# Patient Record
Sex: MALE | Race: WHITE
[De-identification: names, ages, dates, MRNs, and addresses within clinical notes are randomized per-mention and may not be internally consistent; named-entity substitution may affect disease eponyms.]

---

## 2018-07-10 ENCOUNTER — HOSPITAL ENCOUNTER (INPATIENT)
Dept: HOSPITAL 12 - ER | Age: 63
LOS: 5 days | Discharge: NURSING, REHAB OR LONG-TERM CARE | DRG: 344 | End: 2018-07-15
Payer: MEDICAID

## 2018-07-10 VITALS — DIASTOLIC BLOOD PRESSURE: 93 MMHG | SYSTOLIC BLOOD PRESSURE: 134 MMHG

## 2018-07-10 VITALS — BODY MASS INDEX: 20.86 KG/M2 | HEIGHT: 78 IN | WEIGHT: 180.31 LBS

## 2018-07-10 DIAGNOSIS — I10: ICD-10-CM

## 2018-07-10 DIAGNOSIS — E43: ICD-10-CM

## 2018-07-10 DIAGNOSIS — H54.61: ICD-10-CM

## 2018-07-10 DIAGNOSIS — K44.9: ICD-10-CM

## 2018-07-10 DIAGNOSIS — Z79.899: ICD-10-CM

## 2018-07-10 DIAGNOSIS — D63.8: ICD-10-CM

## 2018-07-10 DIAGNOSIS — D50.9: ICD-10-CM

## 2018-07-10 DIAGNOSIS — Z82.49: ICD-10-CM

## 2018-07-10 DIAGNOSIS — R59.9: ICD-10-CM

## 2018-07-10 DIAGNOSIS — K80.20: ICD-10-CM

## 2018-07-10 DIAGNOSIS — E87.1: ICD-10-CM

## 2018-07-10 DIAGNOSIS — M62.50: ICD-10-CM
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
## 2018-07-10 DIAGNOSIS — L02.415: ICD-10-CM

## 2018-07-10 DIAGNOSIS — S22.31XD: ICD-10-CM

## 2018-07-10 DIAGNOSIS — M60.08: Primary | ICD-10-CM

## 2018-07-10 DIAGNOSIS — Z82.3: ICD-10-CM

## 2018-07-10 DIAGNOSIS — B96.1: ICD-10-CM

## 2018-07-10 DIAGNOSIS — T14.8XXD: ICD-10-CM
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
## 2018-07-10 DIAGNOSIS — E88.09: ICD-10-CM

## 2018-07-10 DIAGNOSIS — F20.9: ICD-10-CM

## 2018-07-10 DIAGNOSIS — F10.20: ICD-10-CM

## 2018-07-10 DIAGNOSIS — L98.8: ICD-10-CM

## 2018-07-10 DIAGNOSIS — K21.9: ICD-10-CM

## 2018-07-10 LAB
ALP SERPL-CCNC: 132 U/L (ref 50–136)
ALT SERPL W/O P-5'-P-CCNC: 25 U/L (ref 16–63)
AST SERPL-CCNC: 14 U/L (ref 15–37)
BASOPHILS # BLD AUTO: 0 K/UL (ref 0–8)
BASOPHILS NFR BLD AUTO: 0.4 % (ref 0–2)
BILIRUB DIRECT SERPL-MCNC: 0.1 MG/DL (ref 0–0.2)
BILIRUB SERPL-MCNC: 0.3 MG/DL (ref 0.2–1)
BUN SERPL-MCNC: 18 MG/DL (ref 7–18)
CHLORIDE SERPL-SCNC: 104 MMOL/L (ref 98–107)
CO2 SERPL-SCNC: 25 MMOL/L (ref 21–32)
CREAT SERPL-MCNC: 0.9 MG/DL (ref 0.6–1.3)
EOSINOPHIL # BLD AUTO: 0.1 K/UL (ref 0–0.7)
EOSINOPHIL NFR BLD AUTO: 2.7 % (ref 0–7)
GLUCOSE SERPL-MCNC: 105 MG/DL (ref 74–106)
HCT VFR BLD AUTO: 32.6 % (ref 36.7–47.1)
HGB BLD-MCNC: 10.4 G/DL (ref 12.5–16.3)
LYMPHOCYTES # BLD AUTO: 1.1 K/UL (ref 20–40)
LYMPHOCYTES NFR BLD AUTO: 26.5 % (ref 20.5–51.5)
MCH RBC QN AUTO: 23.6 UUG (ref 23.8–33.4)
MCHC RBC AUTO-ENTMCNC: 32 G/DL (ref 32.5–36.3)
MCV RBC AUTO: 73.9 FL (ref 73–96.2)
MONOCYTES # BLD AUTO: 0.3 K/UL (ref 2–10)
MONOCYTES NFR BLD AUTO: 7.4 % (ref 0–11)
NEUTROPHILS # BLD AUTO: 2.7 K/UL (ref 1.8–8.9)
NEUTROPHILS NFR BLD AUTO: 63 % (ref 38.5–71.5)
PLATELET # BLD AUTO: 303 K/UL (ref 152–348)
POTASSIUM SERPL-SCNC: 4.3 MMOL/L (ref 3.5–5.1)
RBC # BLD AUTO: 4.42 MIL/UL (ref 4.06–5.63)
WBC # BLD AUTO: 4.3 K/UL (ref 3.6–10.2)
WS STN SPEC: 7.2 G/DL (ref 6.4–8.2)

## 2018-07-10 PROCEDURE — A4663 DIALYSIS BLOOD PRESSURE CUFF: HCPCS

## 2018-07-10 NOTE — NUR
RECEIVED PT FROM ER VIA GURNEY . PT IS AWAKE, ALERT, ORIENTEDX4. PT ADMITTED UNDER THE CARE 
OF DR. OCAMPO.DX: ABSCESS. BELONGING LIST DONE. ADMISSION PROCESS AND CARE PLAN 
INITIATED. SKILLED NURSING ASSESSMENT DONE. SAFETY AND COMFORT PROVIDED. CALL LIGHT WITHIN 
REACH. WILL CONTINUE TO MONITOR.

## 2018-07-10 NOTE — NUR
Pt. admitted to [facility]  , under care of Dr. NAZ SYED

Diagnosis:  Abscess

Belongs List completed. MRSA swab done.

## 2018-07-10 NOTE — NUR
CLINICAL PHARMACY NOTE:VANCOMYCIN DOSING



Request for vancomycin dosing on 61 y/o male 6' 160lbs for abdominal abscess.



Temp 97.9 BUN 18  Scr 0.9  WBC 4.3 received vancomycin 1gm in ER



Continue vancomycin 1250mg IVPB q12h. Estimate trough 16. Will order trough level prior to 
4th dose. Will continue to monitor

## 2018-07-11 VITALS — SYSTOLIC BLOOD PRESSURE: 135 MMHG | DIASTOLIC BLOOD PRESSURE: 95 MMHG

## 2018-07-11 VITALS — DIASTOLIC BLOOD PRESSURE: 90 MMHG | SYSTOLIC BLOOD PRESSURE: 136 MMHG

## 2018-07-11 VITALS — SYSTOLIC BLOOD PRESSURE: 128 MMHG | DIASTOLIC BLOOD PRESSURE: 87 MMHG

## 2018-07-11 VITALS — DIASTOLIC BLOOD PRESSURE: 85 MMHG | SYSTOLIC BLOOD PRESSURE: 133 MMHG

## 2018-07-11 LAB
APPEARANCE UR: CLEAR
BASOPHILS # BLD AUTO: 0.1 K/UL (ref 0–8)
BASOPHILS NFR BLD AUTO: 2.7 % (ref 0–2)
BILIRUB UR QL STRIP: NEGATIVE
BUN SERPL-MCNC: 21 MG/DL (ref 7–18)
CHLORIDE SERPL-SCNC: 103 MMOL/L (ref 98–107)
CO2 SERPL-SCNC: 23 MMOL/L (ref 21–32)
COLOR UR: YELLOW
CREAT SERPL-MCNC: 0.9 MG/DL (ref 0.6–1.3)
DEPRECATED SQUAMOUS URNS QL MICRO: (no result) /HPF
EOSINOPHIL # BLD AUTO: 0.1 K/UL (ref 0–0.7)
EOSINOPHIL NFR BLD AUTO: 2.6 % (ref 0–7)
GLUCOSE SERPL-MCNC: 116 MG/DL (ref 74–106)
GLUCOSE UR STRIP-MCNC: NEGATIVE MG/DL
HCT VFR BLD AUTO: 31.5 % (ref 36.7–47.1)
HGB BLD-MCNC: 10 G/DL (ref 12.5–16.3)
HGB UR QL STRIP: NEGATIVE
KETONES UR STRIP-MCNC: NEGATIVE MG/DL
LEUKOCYTE ESTERASE UR QL STRIP: NEGATIVE
LYMPHOCYTES # BLD AUTO: 0.9 K/UL (ref 20–40)
LYMPHOCYTES NFR BLD AUTO: 20.8 % (ref 20.5–51.5)
MAGNESIUM SERPL-MCNC: 1.8 MG/DL (ref 1.8–2.4)
MCH RBC QN AUTO: 23.6 UUG (ref 23.8–33.4)
MCHC RBC AUTO-ENTMCNC: 32 G/DL (ref 32.5–36.3)
MCV RBC AUTO: 74.5 FL (ref 73–96.2)
MONOCYTES # BLD AUTO: 0.3 K/UL (ref 2–10)
MONOCYTES NFR BLD AUTO: 6.9 % (ref 0–11)
NEUTROPHILS # BLD AUTO: 2.9 K/UL (ref 1.8–8.9)
NEUTROPHILS NFR BLD AUTO: 67 % (ref 38.5–71.5)
NITRITE UR QL STRIP: NEGATIVE
PH UR STRIP: 6.5 [PH] (ref 5–8)
PHOSPHATE SERPL-MCNC: 4.7 MG/DL (ref 2.5–4.9)
PLATELET # BLD AUTO: 251 K/UL (ref 152–348)
POTASSIUM SERPL-SCNC: 4.1 MMOL/L (ref 3.5–5.1)
PROT UR QL STRIP: NEGATIVE
RBC # BLD AUTO: 4.23 MIL/UL (ref 4.06–5.63)
RBC #/AREA URNS HPF: (no result) /HPF (ref 0–3)
SP GR UR STRIP: 1.02 (ref 1–1.03)
UROBILINOGEN UR STRIP-MCNC: 0.2 E.U./DL
WBC # BLD AUTO: 4.3 K/UL (ref 3.6–10.2)
WBC #/AREA URNS HPF: (no result) /HPF
WBC #/AREA URNS HPF: (no result) /HPF (ref 0–3)

## 2018-07-11 RX ADMIN — DEXTROSE SCH MLS/HR: 50 INJECTION, SOLUTION INTRAVENOUS at 05:04

## 2018-07-11 RX ADMIN — SODIUM HYPOCHLORITE SCH ML: 2.5 SOLUTION TOPICAL at 17:00

## 2018-07-11 RX ADMIN — DEXTROSE SCH MLS/HR: 50 INJECTION, SOLUTION INTRAVENOUS at 18:19

## 2018-07-11 NOTE — NUR
WOUND CARE CONSULT: PT FOLLOWED BY SURGICAL TEAM. DEFER TO SURGICAL TEAM FOR WOUND TREATMENT 
PLAN. ALL SKIN PROTECTION MEASURES IN PLACE AND DISCUSSED WITH NURSING STAFF. MD IN 
AGREEMENT WITH PLAN OF CARE. WILL SEE PRN.

## 2018-07-11 NOTE — NUR
Patient has been cooperative with care, call light within reach, no distress noted, bed in 
low position, side rails up x2.

## 2018-07-11 NOTE — NUR
received report from night shift nurse, patient in bed awake, no distress noted at this 
time, bed in low position, side rails up x2, bed alarm on. Requested that patient call when 
he first gets up in order to evaluate functional status.

## 2018-07-11 NOTE — NUR
Received pt. on bed, watching TV, alert, oriented and verbally responsive. Denies of any 
pain. Vancomycin IV infusing well, no redness or infiltration noted. Bed on low position, 
locked, side rails up x2 and call light in reach. will continue to monitor the pt.

## 2018-07-11 NOTE — NUR
CLINICAL PHARMACY NOTE:VANCOMYCIN DOSING



To continue vancomycin dosing on 63 y/o male 6' 160lbs for abdominal abscess.



Temp 97.6 BUN 21  Scr 0.9  WBC 4.3 

Trough: pending tomorrow  7/12 at 0530



As renal function stable, will continue vancomycin 1250mg IVPB q12h. Estimate trough 16. 
Trough ordered before 4th scheduled dose (due 7/12 @ 0530). RN endorsed to hold dose if Tr 
>20. Will check level in am and adjust as needed. Will follow

## 2018-07-11 NOTE — NUR
PT SLEPT THROUGHOUT THE SHIFT, PT SHOWS NO SIGNS OF DISTRESS. PRESCRIBED MEDICATION GIVEN 
AND PT TOLERATED IT WELL. PT NO COMPLAIN OF PAIN. CHANGE DRESSING OF PT. CALL LIGHT WITHIN 
REACH. BED ALARM ON, BED IN LOW POSITION AND  SIDE RAILS UPX2. WILL ENDORSE TO DAYSHIFT 
NURSE.

## 2018-07-12 VITALS — SYSTOLIC BLOOD PRESSURE: 122 MMHG | DIASTOLIC BLOOD PRESSURE: 80 MMHG

## 2018-07-12 VITALS — SYSTOLIC BLOOD PRESSURE: 116 MMHG | DIASTOLIC BLOOD PRESSURE: 87 MMHG

## 2018-07-12 VITALS — SYSTOLIC BLOOD PRESSURE: 115 MMHG | DIASTOLIC BLOOD PRESSURE: 73 MMHG

## 2018-07-12 VITALS — SYSTOLIC BLOOD PRESSURE: 133 MMHG | DIASTOLIC BLOOD PRESSURE: 89 MMHG

## 2018-07-12 RX ADMIN — SODIUM HYPOCHLORITE SCH ML: 2.5 SOLUTION TOPICAL at 09:52

## 2018-07-12 RX ADMIN — DEXTROSE SCH MLS/HR: 50 INJECTION, SOLUTION INTRAVENOUS at 06:40

## 2018-07-12 RX ADMIN — DEXTROSE SCH MLS/HR: 50 INJECTION, SOLUTION INTRAVENOUS at 16:54

## 2018-07-12 NOTE — NUR
RECEIVED PATIENT AWAKE IN BED, WATCHING TV. PATIENT IS A/O X4. DENIES PAIN OR DISCOMFORT AT 
THIS TIME. H/L INTACT AND PATENT, NOTED TO RIGHT HAND #22 GAUGE. DRESSINGS NOTED TO AFFECTED 
AREAS, C/D/I. CALL LIGHT IN REACH. ALL NEEDS ATTENDED. WILL CONTINUE TO MONITOR.

## 2018-07-12 NOTE — NUR
CLINICAL PHARMACY NOTE:VANCOMYCIN DOSING



S:

To continue vancomycin dosing on 61 y/o male 6' 160lbs for abdominal abscess.



O:

Temp 97.9    BUN 21 (7/11)   Scr 0.9 (7/11)    WBC 4.3 (7/11)

Trough:  12.1 (on 7/12 at 0530)



Plan:

Since vanco trough level is 12.1 mcg/ml, will change vanco dose from vancomycin 1250mg IVPB 
q12h to vanco 1250 mg IVPB q10h for predicted vanco trough level of  16 mcg/ml at steady 
state. 2nd dose is due today at 1600. Plan to order vanco trough before 4th scheduled dose 
(not yet ordered). Will follow

## 2018-07-12 NOTE — NUR
PATIENT HAS BEEN COOPERATIVE WITH CARE, ALLL NEEDS MET.  NO DISTRESS NOTED THROUGHOUT SHIFT. 
 CURRENTLY PATIENT IN BED, BED IN LOW POSITION, SIDE RAILS UP X2.

## 2018-07-13 VITALS — DIASTOLIC BLOOD PRESSURE: 86 MMHG | SYSTOLIC BLOOD PRESSURE: 129 MMHG

## 2018-07-13 VITALS — DIASTOLIC BLOOD PRESSURE: 80 MMHG | SYSTOLIC BLOOD PRESSURE: 120 MMHG

## 2018-07-13 VITALS — SYSTOLIC BLOOD PRESSURE: 139 MMHG | DIASTOLIC BLOOD PRESSURE: 68 MMHG

## 2018-07-13 VITALS — SYSTOLIC BLOOD PRESSURE: 125 MMHG | DIASTOLIC BLOOD PRESSURE: 90 MMHG

## 2018-07-13 RX ADMIN — TAZOBACTAM SODIUM AND PIPERACILLIN SODIUM SCH MLS/HR: 375; 3 INJECTION, SOLUTION INTRAVENOUS at 15:41

## 2018-07-13 RX ADMIN — DEXTROSE SCH MLS/HR: 50 INJECTION, SOLUTION INTRAVENOUS at 22:06

## 2018-07-13 RX ADMIN — SODIUM HYPOCHLORITE SCH ML: 2.5 SOLUTION TOPICAL at 09:35

## 2018-07-13 RX ADMIN — DEXTROSE SCH MLS/HR: 50 INJECTION, SOLUTION INTRAVENOUS at 02:04

## 2018-07-13 RX ADMIN — TAZOBACTAM SODIUM AND PIPERACILLIN SODIUM SCH MLS/HR: 375; 3 INJECTION, SOLUTION INTRAVENOUS at 18:00

## 2018-07-13 RX ADMIN — DEXTROSE SCH MLS/HR: 50 INJECTION, SOLUTION INTRAVENOUS at 13:19

## 2018-07-13 NOTE — NUR
Vancomycin started now, later than scheduled dose, since med was unavailable till now. IVATB 
Infusing well, will continue to monitor closely.

## 2018-07-13 NOTE — NUR
RECEIVED PATIENT ON BED, ASLEEP, A AND O X 3-4. NO ACUTE DISTRESS NOTED W/ IV SITE ON THE R 
HAND #22 INTACT AND PATENT. ON IVATB VANCOMYCING Q10. KPAD AT BEDSIDE. INDEPENDENT WITH 
ADLS. AMBULATORY WITH CANE. COMPLAINTS OF PAIN BUT REFUSES PAIN MEDICATION. COMFORT MEASURES 
PROVIDED. WILL CONTINUE TO MONITOR CLOSELY.

## 2018-07-13 NOTE — NUR
ZOSYN 1800 DOSE NON ADMINISTERED SINCE LAST ZOSYN DOSE WAS ADMINISTERED 2 HOURS AGO. 
DISCUSSED WITH PHARMACIST EARLIER TO SKIP 1800 DOSE. PATIENT IN STABLE CONDITION WILL 
CONTINUE TO MONITOR.

## 2018-07-13 NOTE — NUR
PATIENT ASLEEP IN BED. SLEPT WELL THROUGHOUT THE NIGHT. VS WNL. CALL LIGHT IN REACH. ALL 
NEEDS ATTENDED. WILL CONTINUE TO MONITOR.

## 2018-07-13 NOTE — NUR
Received patient laying comfortably in bed. No acute distress noted. A/O x 4. C/O of pain or 
SOB. On Room air. Noted multiple skin issues; open skin on the right inguinal area covered 
with gauze and tape; right elbow wound covered in steri strips, abscess on the right flank 
covered with gauze and tape and left leg scab . Safety initiated. Call light within reach. 
Bed is in low and locked position. Bed alarm on. Fall precautions observed. Will continue to 
monitor.

## 2018-07-13 NOTE — NUR
Wound treatment done on right flank and right inguinal area. small serousanguinous drainage 
noted on both sites. wound dressing change, well tolerated. will continue to monitor 
closely.

## 2018-07-13 NOTE — NUR
CLINICAL PHARMACY NOTE:VANCOMYCIN DOSING



S:

To continue vancomycin dosing on 61 y/o male 6' 160lbs for abdominal abscess.



O:

Temp 98    BUN 21 (7/11)   Scr 0.9 (7/11)    WBC 4.3 (7/11)

Trough:  17.8 today at 1130



Plan:

As trough within range, will continue current regimen of vanco 1250mg q10h for now. Will 
consider rechecking trough if pt were to remain on prolonged course or status were to 
change. Will follow

## 2018-07-14 VITALS — SYSTOLIC BLOOD PRESSURE: 127 MMHG | DIASTOLIC BLOOD PRESSURE: 93 MMHG

## 2018-07-14 VITALS — SYSTOLIC BLOOD PRESSURE: 113 MMHG | DIASTOLIC BLOOD PRESSURE: 77 MMHG

## 2018-07-14 VITALS — SYSTOLIC BLOOD PRESSURE: 122 MMHG | DIASTOLIC BLOOD PRESSURE: 76 MMHG

## 2018-07-14 RX ADMIN — TAZOBACTAM SODIUM AND PIPERACILLIN SODIUM SCH MLS/HR: 375; 3 INJECTION, SOLUTION INTRAVENOUS at 00:41

## 2018-07-14 RX ADMIN — DEXTROSE SCH MLS/HR: 50 INJECTION, SOLUTION INTRAVENOUS at 08:32

## 2018-07-14 RX ADMIN — TAZOBACTAM SODIUM AND PIPERACILLIN SODIUM SCH MLS/HR: 375; 3 INJECTION, SOLUTION INTRAVENOUS at 12:30

## 2018-07-14 RX ADMIN — TAZOBACTAM SODIUM AND PIPERACILLIN SODIUM SCH MLS/HR: 375; 3 INJECTION, SOLUTION INTRAVENOUS at 17:04

## 2018-07-14 RX ADMIN — DEXTROSE SCH MLS/HR: 50 INJECTION, SOLUTION INTRAVENOUS at 17:04

## 2018-07-14 RX ADMIN — LEVOFLOXACIN SCH MG: 750 TABLET, FILM COATED ORAL at 19:00

## 2018-07-14 RX ADMIN — SODIUM HYPOCHLORITE SCH ML: 2.5 SOLUTION TOPICAL at 08:57

## 2018-07-14 RX ADMIN — TAZOBACTAM SODIUM AND PIPERACILLIN SODIUM SCH MLS/HR: 375; 3 INJECTION, SOLUTION INTRAVENOUS at 05:02

## 2018-07-14 NOTE — NUR
No changes t/o shift. Patient slept t/o shift. Vital signs stable. No c/o pain. Good urine 
output. Safety and comfort measures maintained t/o shift. All meds given as ordered. All 
needs met.

## 2018-07-14 NOTE — NUR
PT HAS A NEW IV LEFT FOREARM #20 PATENT AND INTACT. COMPLIANT WITH MEDICATIONS, DIET, AND 
ALLOWS CARE. PT HAS NO SIGNS OF ACUTE DISTRESS. CONTINUE TO MONITOR PT.

## 2018-07-14 NOTE — NUR
CLINICAL PHARMACY NOTE:VANCOMYCIN DOSING



S:

To continue vancomycin dosing on 63 y/o male 6' 160lbs for abdominal abscess.



O:

Temp 97.9    BUN 21 (7/11)   Scr 0.9 (7/11)    WBC 4.3 (7/11)

Trough:  17.8 yesterday at 1130



Plan:

Will continue current regimen of vanco 1250mg q10h for now. Will check renal function in am 
to adjust the dose if needed. Will consider rechecking trough if pt were to remain on 
prolonged course or status were to change. Will follow

## 2018-07-14 NOTE — NUR
Received patient laying comfortably in bed. No acute distress noted. A/O x 4. NO C/O of pain 
or SOB. On Room air. Noted multiple skin issues; open skin on the right inguinal area 
covered with ABD pads and tape; right elbow wound covered in steri strips, abscess on the 
right flank covered with gauze and tape and left leg scab, open to air. Safety initiated. 
Call light within reach. Bed is in low and locked position. Bed alarm on. Fall precautions 
observed. Will continue to monitor.

## 2018-07-14 NOTE — NUR
PATIENT REFUSE LEVAQUIN PO. DESPITE EXLAINING THE IMPORTANCE OF TAKING THE MEDICATION, HE 
STILL REFUSES THE MEDICATION. HE SAYS, "I'LL TAKE THE MEDICATION AFTER SEEING THE ID 
DOCTOR". HE DOES NOT RECALL SEEING THE ID DOCTOR INFORMING HIM OF THE CHANGE OF MEDICATION. 
WILL CONTINUE TO MONITOR.

## 2018-07-15 VITALS — DIASTOLIC BLOOD PRESSURE: 118 MMHG | SYSTOLIC BLOOD PRESSURE: 144 MMHG

## 2018-07-15 VITALS — SYSTOLIC BLOOD PRESSURE: 123 MMHG | DIASTOLIC BLOOD PRESSURE: 85 MMHG

## 2018-07-15 VITALS — DIASTOLIC BLOOD PRESSURE: 88 MMHG | SYSTOLIC BLOOD PRESSURE: 126 MMHG

## 2018-07-15 VITALS — SYSTOLIC BLOOD PRESSURE: 144 MMHG | DIASTOLIC BLOOD PRESSURE: 98 MMHG

## 2018-07-15 LAB
BUN SERPL-MCNC: 15 MG/DL (ref 7–18)
CHLORIDE SERPL-SCNC: 105 MMOL/L (ref 98–107)
CO2 SERPL-SCNC: 24 MMOL/L (ref 21–32)
CREAT SERPL-MCNC: 0.9 MG/DL (ref 0.6–1.3)
GLUCOSE SERPL-MCNC: 87 MG/DL (ref 74–106)
POTASSIUM SERPL-SCNC: 4.1 MMOL/L (ref 3.5–5.1)

## 2018-07-15 RX ADMIN — SODIUM HYPOCHLORITE SCH ML: 2.5 SOLUTION TOPICAL at 09:28

## 2018-07-15 RX ADMIN — LEVOFLOXACIN SCH MG: 750 TABLET, FILM COATED ORAL at 19:13

## 2018-07-15 NOTE — NUR
Received patient awake & alert no SOB denies chest pain. Aware that he will be discharged 
today to [facility].  Vital signs WNL. Awaiting for transportation, pick 
up time is 8:00pm.

## 2018-07-15 NOTE — NUR
PT D/C TO [facility] VIA AMBULANCE AT 2020 UNDER THE CARE OF 2 EMT'S. REPORT 
WAS GIVEN TO NURSE KRYSTLE AT [facility]. PT STABLE AT TIME OF D/C, NO 
COMPLAINTS.

## 2018-07-15 NOTE — NUR
No changes t/o shift. Patient slept t/o shift. Vital signs stable. No c/o pain. Good urine 
output. Safety and comfort measures maintained t/o shift. All meds given as ordered. 
However, he did refuse his Levaquin PO. Dressing on his right flank and inguinal area C/D/I. 
All needs met.